# Patient Record
Sex: MALE | Race: BLACK OR AFRICAN AMERICAN | NOT HISPANIC OR LATINO | URBAN - METROPOLITAN AREA
[De-identification: names, ages, dates, MRNs, and addresses within clinical notes are randomized per-mention and may not be internally consistent; named-entity substitution may affect disease eponyms.]

---

## 2017-08-04 ENCOUNTER — EMERGENCY (EMERGENCY)
Facility: HOSPITAL | Age: 32
LOS: 1 days | Discharge: HOME | End: 2017-08-04
Attending: EMERGENCY MEDICINE | Admitting: EMERGENCY MEDICINE
Payer: SELF-PAY

## 2017-08-04 VITALS
RESPIRATION RATE: 16 BRPM | HEART RATE: 85 BPM | OXYGEN SATURATION: 95 % | TEMPERATURE: 98 F | SYSTOLIC BLOOD PRESSURE: 129 MMHG | DIASTOLIC BLOOD PRESSURE: 88 MMHG

## 2017-08-04 PROCEDURE — 99284 EMERGENCY DEPT VISIT MOD MDM: CPT

## 2017-08-04 RX ORDER — IBUPROFEN 200 MG
600 TABLET ORAL ONCE
Qty: 0 | Refills: 0 | Status: COMPLETED | OUTPATIENT
Start: 2017-08-04 | End: 2017-08-04

## 2017-08-04 RX ORDER — OXYCODONE HYDROCHLORIDE 5 MG/1
1 TABLET ORAL
Qty: 8 | Refills: 0 | OUTPATIENT
Start: 2017-08-04 | End: 2017-08-08

## 2017-08-04 RX ORDER — ACETAMINOPHEN 500 MG
975 TABLET ORAL ONCE
Qty: 0 | Refills: 0 | Status: COMPLETED | OUTPATIENT
Start: 2017-08-04 | End: 2017-08-04

## 2017-08-04 RX ADMIN — Medication 975 MILLIGRAM(S): at 22:24

## 2017-08-04 RX ADMIN — Medication 600 MILLIGRAM(S): at 22:24

## 2017-08-04 NOTE — CONSULT NOTE ADULT - SUBJECTIVE AND OBJECTIVE BOX
Patient is a 31y old Male who presents with a chief complaint of pain after breaking a tooth 2 days ago.    HPI: Patient presents to ED in pain reporting that he broke one of his back right teeth two days ago on a chicken bone. Patient states that he saw blood yesterday when he was brushing, but not today. Patient reports that yesterday his tongue touched the tooth after it broke and ever since has been painful.    PAST MEDICAL & SURGICAL HISTORY:  No pertinent past medical history    MEDICATIONS  (STANDING): None  MEDICATIONS  (PRN): None    Allergies: No Known Allergies    FAMILY HISTORY:  No pertinent family history in first degree relatives    SOCIAL HISTORY: Patient presents in ED alone. Patient works for [employer].    Last Dental Visit: 5 years ago    Vital Signs Last 24 Hrs  T(C): 36.6 (04 Aug 2017 21:21), Max: 36.6 (04 Aug 2017 21:21)  T(F): 97.9 (04 Aug 2017 21:21), Max: 97.9 (04 Aug 2017 21:21)  HR: 85 (04 Aug 2017 21:21) (85 - 85)  BP: 129/88 (04 Aug 2017 21:21) (129/88 - 129/88)  BP(mean): --  RR: 16 (04 Aug 2017 21:21) (16 - 16)  SpO2: 95% (04 Aug 2017 21:21) (95% - 95%)    EOE:  TMJ ( -  ) clicks                    ( -  ) pops                    (  -  ) crepitus             Mandible FROM             Facial bones and MOM grossly intact             (  - ) trismus             ( -  ) LAD             (  - ) swelling             ( -  ) asymmetry             ( -  ) palpation             ( -  ) dysphagia    IOE:  Permanent dentition, multiple restorations, multiple carious teeth           hard/soft palate:  (  - ) palatal torus           tongue/FOM WNL           labial/buccal mucosa WNL           (  - ) percussion           ( + ) palpation: Buccal and palatal sites #4/5           (  - ) swelling   Fistula present mucosa superior to site #4. Broken root tips visible at sites #4/5.    Radiographs: None taken at this encounter    ASSESSMENT: 31 year old male has retained root tips #4 and #5 and fistula at site #4/5. No signs of acute infection, no swelling, no purulence, no bleeding.     RECOMMENDATIONS:   1) Pain control as needed  2) Dental F/U with outpatient dentist for comprehensive dental care.   3) If any swelling or acute oral changes occur, return to the ED    Hattie Chandler DDS, Britta Montgomery DDS. Pager #: [phone]  Case discussed with chief resident: Dr. Ligia Hernandez.

## 2017-08-04 NOTE — ED PROVIDER NOTE - CARE PLAN
Principal Discharge DX:	Tooth pain  Instructions for follow-up, activity and diet:	Please follow-up with the Dental Clinic on Monday (8/7/14) for evaluation.  Can use over-the-counter Tylenol and Ibuprofen as per package directions.  Please come to the Emergency Department for re-evaluation if you start to develop fevers (temperature > 100.4), increased swelling, trouble swallowing, and/or shortness of breath.
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
## 2017-08-04 NOTE — ED PROVIDER NOTE - OBJECTIVE STATEMENT
32yo M presents to ED complaining of a chipped tooth and dental pain.  Mentions he was eating chicken 2 days ago where he cracked his tooth and spit it out.  Patient did not have any discomfort at this time.  This AM patient noticed blood after brushing, this was aggravated later while he was at work and the area started to bleed.  No fevers, chills, N/V.  No trouble swallowing, SOB, cough.  Was sick w/ flu last week => has resolved since then.  Has not tried anything at home for pain.  Has not seen a dentist for this problem.

## 2017-08-04 NOTE — ED PROVIDER NOTE - ATTENDING CONTRIBUTION TO CARE
31 yom no pmhx poor dentition at baseline, does not see dentists recently but has had mult prior cavities and dental fillings, missing a few teeth at baseline, states bit into a chicken bone and his right upper anterior molar fell out. states most of the molar was already missing at baseline, but the small remaining piece "cracked off." today was having shooting pains in the area when trying to eat/drink. no f/c.     ROS:   constitutional - no fever, no chills  eyes - no visual changes, no redness  eent - no sore throat, no nasal congestion  cvs - no chest pain, no leg swelling  resp - no shortness of breath, no cough  gi - no abdominal pain, no vomiting, no diarrhea  gu - no dysuria, no hematuria  msk - no acute back pain, no joint swelling  skin - no rashes, no jaundice  neuro - no headache, no focal weakness  psych - no acute mental health issue     Physical Exam:   constitutional - well appearing, awake and alert, oriented x3  head - no external evidence of trauma  cvs - rrr, no murmurs, no peripheral edema  resp - breath sounds clear and equal bilat  gi - abdomen soft and nontender, no rigidity, guarding or rebound, bowel sounds present  msk - moving all extremities spontaneously  neuro - alert and oriented x3, no focal deficits, CNs 2-12 grossly intact  skin- no jaundice, warm and dry  psych - mood and affect wnl, no apparent risk to self or others   ent - diffuse poor dentition w widespread dental caries and a few missing teeth at baseline. right upper anterior molar with small piece of visible tooth remaining, otherwise likely luke class 4 dental fx.     seen by dental in ED who feel the majority of the tooth was already missing prior to injury because there is chronic appearing gum growing over area of prior tooth. recommending pain management, f/u with dental / omfs clinic at [facility] on monday. additional verbal instructions regarding diagnosis, return precautions and follow up plan given to pt and/or family. MILENA Arredondo MD see attending statement below

## 2017-08-04 NOTE — ED PROVIDER NOTE - PHYSICAL EXAMINATION
Physical Exam: *Gen: NAD, AAO*3, well-appearing, well-nourished; *HEENT: NC/AT, airway patient, MMM, uvula midline, 6TH TOOTH (ON UPPER ARCH) IS MISSING W/ EVIDENCE OF A RETAINED ROOT TIP IN THE AREA - NO EVIDENCE OF ABSCESS OR SWELLING IN THE AREA; *Neck: supple, trachea midline; *CV: RRR, S1/S2 present, no murmurs/rubs/gallops, no peripheral edema; *Resp: no respiratory distress, LCTAB, no wheezing/rales/rhonchi; *Abd: non-distended, bowel sounds present, soft N/Tx4, no guarding or rigidity, no organomegaly; *Neuro: no focal neuro defecits, moving all limbs appropriately; *Extremities: no gross deformity, PMS*4; *Back: no gross deformity, no CVA tenderness ~ Cruz Ignacio M.D.

## 2017-08-04 NOTE — ED PROVIDER NOTE - NS ED ROS FT
Review of Systems: *Constitutional: no fevers, no chills; *Eyes: no eye pain, no blurred vision; *ENMT: no hearing changes, no sore throat, TOOTH PAIN; *CV: no chest pain, no palpitations; *Resp: no shortness of breath, no cough; *GI: no abdominal pain, no nausea, no vomiting, no diarrhea, no constipation, no bloody stools; *G/U: no dysuria, no frequency, no hematuria; *MSK: no joint pain, no swelling, no redness; *Skin: no rashes, no wounds; *Heme/Lymph: no bleeding, no bruising; *Allergic/Immunologic: no environmental allergies, no food allergies, no immunosuppressive disorder ~ Cruz Ignacio M.D.

## 2017-08-04 NOTE — ED PROVIDER NOTE - PLAN OF CARE
Please follow-up with the Dental Clinic on Monday (8/7/14) for evaluation.  Can use over-the-counter Tylenol and Ibuprofen as per package directions.  Please come to the Emergency Department for re-evaluation if you start to develop fevers (temperature > 100.4), increased swelling, trouble swallowing, and/or shortness of breath.

## 2018-01-18 ENCOUNTER — EMERGENCY (EMERGENCY)
Facility: HOSPITAL | Age: 33
LOS: 1 days | Discharge: HOME | End: 2018-01-18
Attending: EMERGENCY MEDICINE | Admitting: EMERGENCY MEDICINE
Payer: COMMERCIAL

## 2018-01-18 VITALS
RESPIRATION RATE: 18 BRPM | TEMPERATURE: 98 F | SYSTOLIC BLOOD PRESSURE: 133 MMHG | OXYGEN SATURATION: 98 % | HEART RATE: 74 BPM | DIASTOLIC BLOOD PRESSURE: 87 MMHG

## 2018-01-18 VITALS
TEMPERATURE: 98 F | HEART RATE: 73 BPM | RESPIRATION RATE: 18 BRPM | SYSTOLIC BLOOD PRESSURE: 100 MMHG | OXYGEN SATURATION: 99 % | DIASTOLIC BLOOD PRESSURE: 73 MMHG

## 2018-01-18 PROCEDURE — 99282 EMERGENCY DEPT VISIT SF MDM: CPT

## 2018-01-18 PROCEDURE — 99283 EMERGENCY DEPT VISIT LOW MDM: CPT

## 2018-01-18 RX ORDER — ACETAMINOPHEN 500 MG
975 TABLET ORAL ONCE
Qty: 0 | Refills: 0 | Status: COMPLETED | OUTPATIENT
Start: 2018-01-18 | End: 2018-01-18

## 2018-01-18 RX ADMIN — Medication 975 MILLIGRAM(S): at 20:38
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
## 2018-01-18 NOTE — ED PROVIDER NOTE - PHYSICAL EXAMINATION
Gen: Well appearing, NAD  Head: NCAT  HEENT: MMM, Normal conjunctiva  Lung: CTAB, no rales, rhonchi or wheezing  CV: RRR, no murmurs, rubs or gallops  Abd: soft, NTND, no rebound or guarding  MSK: No CVA tenderness. No edema, no visible deformities  Neuro: No focal neurologic deficits. CN II-XII intact. Normal strength and sensation x 4  Skin: Warm and dry, no evidence of rash  Psych: normal mood and affect, A&Ox3
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
## 2018-01-18 NOTE — ED PROVIDER NOTE - NS ED ROS FT
ROS: denies weakness, dizziness, fevers/chills, nausea/vomiting, chest pain, SOB, diaphoresis, abdominal pain, back/neck pain, dysuria/hematuria, or rash  +HA

## 2018-04-10 ENCOUNTER — EMERGENCY (EMERGENCY)
Facility: HOSPITAL | Age: 33
LOS: 1 days | Discharge: HOME | End: 2018-04-10
Attending: EMERGENCY MEDICINE
Payer: SELF-PAY

## 2018-04-10 VITALS
HEART RATE: 99 BPM | SYSTOLIC BLOOD PRESSURE: 132 MMHG | TEMPERATURE: 98 F | RESPIRATION RATE: 18 BRPM | DIASTOLIC BLOOD PRESSURE: 88 MMHG | OXYGEN SATURATION: 97 %

## 2018-04-10 LAB
APPEARANCE UR: CLEAR — SIGNIFICANT CHANGE UP
BILIRUB UR-MCNC: NEGATIVE — SIGNIFICANT CHANGE UP
COLOR SPEC: YELLOW — SIGNIFICANT CHANGE UP
DIFF PNL FLD: ABNORMAL
EPI CELLS # UR: SIGNIFICANT CHANGE UP /HPF
GLUCOSE UR QL: NEGATIVE — SIGNIFICANT CHANGE UP
HIV 1 & 2 AB SERPL IA.RAPID: SIGNIFICANT CHANGE UP
KETONES UR-MCNC: NEGATIVE — SIGNIFICANT CHANGE UP
LEUKOCYTE ESTERASE UR-ACNC: ABNORMAL
NITRITE UR-MCNC: NEGATIVE — SIGNIFICANT CHANGE UP
PH UR: 6 — SIGNIFICANT CHANGE UP (ref 5–8)
PROT UR-MCNC: 30 MG/DL
RBC CASTS # UR COMP ASSIST: ABNORMAL /HPF (ref 0–2)
SP GR SPEC: >1.03 — HIGH (ref 1.01–1.02)
UROBILINOGEN FLD QL: NEGATIVE — SIGNIFICANT CHANGE UP
WBC UR QL: SIGNIFICANT CHANGE UP /HPF (ref 0–5)

## 2018-04-10 PROCEDURE — 99284 EMERGENCY DEPT VISIT MOD MDM: CPT

## 2018-04-10 PROCEDURE — 86780 TREPONEMA PALLIDUM: CPT

## 2018-04-10 PROCEDURE — 81001 URINALYSIS AUTO W/SCOPE: CPT

## 2018-04-10 PROCEDURE — 99283 EMERGENCY DEPT VISIT LOW MDM: CPT

## 2018-04-10 PROCEDURE — 86703 HIV-1/HIV-2 1 RESULT ANTBDY: CPT

## 2018-04-10 RX ORDER — AZITHROMYCIN 500 MG/1
1000 TABLET, FILM COATED ORAL ONCE
Qty: 0 | Refills: 0 | Status: COMPLETED | OUTPATIENT
Start: 2018-04-10 | End: 2018-04-10

## 2018-04-10 RX ORDER — AZITHROMYCIN 500 MG/1
1000 TABLET, FILM COATED ORAL ONCE
Qty: 0 | Refills: 0 | Status: DISCONTINUED | OUTPATIENT
Start: 2018-04-10 | End: 2018-04-10

## 2018-04-10 RX ORDER — CEFTRIAXONE 500 MG/1
250 INJECTION, POWDER, FOR SOLUTION INTRAMUSCULAR; INTRAVENOUS ONCE
Qty: 0 | Refills: 0 | Status: COMPLETED | OUTPATIENT
Start: 2018-04-10 | End: 2018-04-10

## 2018-04-10 RX ORDER — AZITHROMYCIN 500 MG/1
1 TABLET, FILM COATED ORAL DAILY
Qty: 0 | Refills: 0 | Status: DISCONTINUED | OUTPATIENT
Start: 2018-04-10 | End: 2018-04-10

## 2018-04-10 RX ADMIN — CEFTRIAXONE 250 MILLIGRAM(S): 500 INJECTION, POWDER, FOR SOLUTION INTRAMUSCULAR; INTRAVENOUS at 22:05

## 2018-04-10 RX ADMIN — AZITHROMYCIN 1000 MILLIGRAM(S): 500 TABLET, FILM COATED ORAL at 22:19
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
## 2018-04-10 NOTE — ED ADULT TRIAGE NOTE - CHIEF COMPLAINT QUOTE
STD check   ex-partner tested positive for chlamydia STD check   ex-partner tested positive for chlamydia (today). Vaginal intercourse last night, no condom.
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
## 2018-04-10 NOTE — ED ADULT NURSE NOTE - CHIEF COMPLAINT QUOTE
STD check   ex-partner tested positive for chlamydia (today). Vaginal intercourse last night, no condom.

## 2018-04-11 LAB
C TRACH RRNA SPEC QL NAA+PROBE: DETECTED
N GONORRHOEA RRNA SPEC QL NAA+PROBE: SIGNIFICANT CHANGE UP
SPECIMEN SOURCE: SIGNIFICANT CHANGE UP
T PALLIDUM AB TITR SER: NEGATIVE — SIGNIFICANT CHANGE UP
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
## 2019-05-01 ENCOUNTER — EMERGENCY (EMERGENCY)
Facility: HOSPITAL | Age: 34
LOS: 1 days | Discharge: HOME | End: 2019-05-01
Attending: EMERGENCY MEDICINE | Admitting: EMERGENCY MEDICINE
Payer: COMMERCIAL

## 2019-05-01 VITALS
DIASTOLIC BLOOD PRESSURE: 59 MMHG | TEMPERATURE: 97 F | OXYGEN SATURATION: 100 % | SYSTOLIC BLOOD PRESSURE: 126 MMHG | RESPIRATION RATE: 18 BRPM | HEART RATE: 80 BPM

## 2019-05-01 LAB
ALBUMIN SERPL ELPH-MCNC: 4.2 G/DL — SIGNIFICANT CHANGE UP (ref 3.3–5)
ALP SERPL-CCNC: 69 U/L — SIGNIFICANT CHANGE UP (ref 40–120)
ALT FLD-CCNC: 28 U/L — SIGNIFICANT CHANGE UP (ref 4–41)
ANION GAP SERPL CALC-SCNC: 12 MMO/L — SIGNIFICANT CHANGE UP (ref 7–14)
AST SERPL-CCNC: 32 U/L — SIGNIFICANT CHANGE UP (ref 4–40)
BASOPHILS # BLD AUTO: 0.02 K/UL — SIGNIFICANT CHANGE UP (ref 0–0.2)
BASOPHILS NFR BLD AUTO: 0.3 % — SIGNIFICANT CHANGE UP (ref 0–2)
BILIRUB SERPL-MCNC: 0.3 MG/DL — SIGNIFICANT CHANGE UP (ref 0.2–1.2)
BUN SERPL-MCNC: 18 MG/DL — SIGNIFICANT CHANGE UP (ref 7–23)
CALCIUM SERPL-MCNC: 9.6 MG/DL — SIGNIFICANT CHANGE UP (ref 8.4–10.5)
CHLORIDE SERPL-SCNC: 102 MMOL/L — SIGNIFICANT CHANGE UP (ref 98–107)
CO2 SERPL-SCNC: 26 MMOL/L — SIGNIFICANT CHANGE UP (ref 22–31)
CREAT SERPL-MCNC: 1.23 MG/DL — SIGNIFICANT CHANGE UP (ref 0.5–1.3)
EOSINOPHIL # BLD AUTO: 0.28 K/UL — SIGNIFICANT CHANGE UP (ref 0–0.5)
EOSINOPHIL NFR BLD AUTO: 4.6 % — SIGNIFICANT CHANGE UP (ref 0–6)
GLUCOSE SERPL-MCNC: 85 MG/DL — SIGNIFICANT CHANGE UP (ref 70–99)
HCT VFR BLD CALC: 44.3 % — SIGNIFICANT CHANGE UP (ref 39–50)
HGB BLD-MCNC: 14.6 G/DL — SIGNIFICANT CHANGE UP (ref 13–17)
IMM GRANULOCYTES NFR BLD AUTO: 0.3 % — SIGNIFICANT CHANGE UP (ref 0–1.5)
LYMPHOCYTES # BLD AUTO: 1.65 K/UL — SIGNIFICANT CHANGE UP (ref 1–3.3)
LYMPHOCYTES # BLD AUTO: 27 % — SIGNIFICANT CHANGE UP (ref 13–44)
MCHC RBC-ENTMCNC: 30.8 PG — SIGNIFICANT CHANGE UP (ref 27–34)
MCHC RBC-ENTMCNC: 33 % — SIGNIFICANT CHANGE UP (ref 32–36)
MCV RBC AUTO: 93.5 FL — SIGNIFICANT CHANGE UP (ref 80–100)
MONOCYTES # BLD AUTO: 0.55 K/UL — SIGNIFICANT CHANGE UP (ref 0–0.9)
MONOCYTES NFR BLD AUTO: 9 % — SIGNIFICANT CHANGE UP (ref 2–14)
NEUTROPHILS # BLD AUTO: 3.58 K/UL — SIGNIFICANT CHANGE UP (ref 1.8–7.4)
NEUTROPHILS NFR BLD AUTO: 58.8 % — SIGNIFICANT CHANGE UP (ref 43–77)
NRBC # FLD: 0 K/UL — SIGNIFICANT CHANGE UP (ref 0–0)
PLATELET # BLD AUTO: 233 K/UL — SIGNIFICANT CHANGE UP (ref 150–400)
PMV BLD: 9.8 FL — SIGNIFICANT CHANGE UP (ref 7–13)
POTASSIUM SERPL-MCNC: 4.5 MMOL/L — SIGNIFICANT CHANGE UP (ref 3.5–5.3)
POTASSIUM SERPL-SCNC: 4.5 MMOL/L — SIGNIFICANT CHANGE UP (ref 3.5–5.3)
PROT SERPL-MCNC: 7.3 G/DL — SIGNIFICANT CHANGE UP (ref 6–8.3)
RBC # BLD: 4.74 M/UL — SIGNIFICANT CHANGE UP (ref 4.2–5.8)
RBC # FLD: 12.1 % — SIGNIFICANT CHANGE UP (ref 10.3–14.5)
SODIUM SERPL-SCNC: 140 MMOL/L — SIGNIFICANT CHANGE UP (ref 135–145)
WBC # BLD: 6.1 K/UL — SIGNIFICANT CHANGE UP (ref 3.8–10.5)
WBC # FLD AUTO: 6.1 K/UL — SIGNIFICANT CHANGE UP (ref 3.8–10.5)

## 2019-05-01 PROCEDURE — 99284 EMERGENCY DEPT VISIT MOD MDM: CPT

## 2019-05-01 PROCEDURE — 74177 CT ABD & PELVIS W/CONTRAST: CPT | Mod: 26

## 2019-05-01 RX ORDER — ONDANSETRON 8 MG/1
4 TABLET, FILM COATED ORAL ONCE
Qty: 0 | Refills: 0 | Status: COMPLETED | OUTPATIENT
Start: 2019-05-01 | End: 2019-05-01

## 2019-05-01 RX ORDER — MULTIVIT WITH MIN/MFOLATE/K2 340-15/3 G
150 POWDER (GRAM) ORAL
Qty: 300 | Refills: 0 | OUTPATIENT
Start: 2019-05-01 | End: 2019-05-01

## 2019-05-01 RX ORDER — SODIUM CHLORIDE 9 MG/ML
1000 INJECTION INTRAMUSCULAR; INTRAVENOUS; SUBCUTANEOUS ONCE
Qty: 0 | Refills: 0 | Status: COMPLETED | OUTPATIENT
Start: 2019-05-01 | End: 2019-05-01

## 2019-05-01 RX ORDER — DOCUSATE SODIUM 100 MG
1 CAPSULE ORAL
Qty: 30 | Refills: 0 | OUTPATIENT
Start: 2019-05-01 | End: 2019-05-15

## 2019-05-01 RX ADMIN — ONDANSETRON 4 MILLIGRAM(S): 8 TABLET, FILM COATED ORAL at 14:26

## 2019-05-01 RX ADMIN — SODIUM CHLORIDE 1000 MILLILITER(S): 9 INJECTION INTRAMUSCULAR; INTRAVENOUS; SUBCUTANEOUS at 14:29

## 2019-05-01 NOTE — ED ADULT TRIAGE NOTE - CHIEF COMPLAINT QUOTE
Co constipation and right mid abdominal pain since yesterday. Last normal BM was Monday. Pt states he vomited several times at 4am today.

## 2019-05-01 NOTE — ED PROVIDER NOTE - CLINICAL SUMMARY MEDICAL DECISION MAKING FREE TEXT BOX
immune labs and CT unremarkable, stool laxatives and diet/life style modifications educated; PMD or GI follow up recommended for reassessment

## 2019-05-01 NOTE — ED PROVIDER NOTE - NSFOLLOWUPCLINICS_GEN_ALL_ED_FT
[facility]  Gastroenterology  [address] [place] [id number]  Phone: [phone]  Fax:   Follow Up Time:

## 2019-09-26 ENCOUNTER — EMERGENCY (EMERGENCY)
Facility: HOSPITAL | Age: 34
LOS: 1 days | Discharge: HOME | End: 2019-09-26
Attending: EMERGENCY MEDICINE
Payer: COMMERCIAL

## 2019-09-26 VITALS
RESPIRATION RATE: 17 BRPM | HEIGHT: 73 IN | DIASTOLIC BLOOD PRESSURE: 86 MMHG | WEIGHT: 195.11 LBS | TEMPERATURE: 100 F | SYSTOLIC BLOOD PRESSURE: 130 MMHG | HEART RATE: 81 BPM | OXYGEN SATURATION: 98 %

## 2019-09-26 VITALS
TEMPERATURE: 98 F | SYSTOLIC BLOOD PRESSURE: 122 MMHG | HEART RATE: 80 BPM | RESPIRATION RATE: 18 BRPM | OXYGEN SATURATION: 99 % | DIASTOLIC BLOOD PRESSURE: 74 MMHG

## 2019-09-26 PROBLEM — Z78.9 OTHER SPECIFIED HEALTH STATUS: Chronic | Status: ACTIVE | Noted: 2019-05-01

## 2019-09-26 PROCEDURE — 99283 EMERGENCY DEPT VISIT LOW MDM: CPT | Mod: 25

## 2019-09-26 PROCEDURE — 71046 X-RAY EXAM CHEST 2 VIEWS: CPT | Mod: 26

## 2019-09-26 PROCEDURE — 71046 X-RAY EXAM CHEST 2 VIEWS: CPT

## 2019-09-26 PROCEDURE — 99283 EMERGENCY DEPT VISIT LOW MDM: CPT

## 2019-09-26 RX ORDER — IBUPROFEN 200 MG
600 TABLET ORAL ONCE
Refills: 0 | Status: COMPLETED | OUTPATIENT
Start: 2019-09-26 | End: 2019-09-26

## 2019-09-26 RX ADMIN — Medication 600 MILLIGRAM(S): at 18:20

## 2019-09-26 RX ADMIN — Medication 600 MILLIGRAM(S): at 16:42

## 2019-09-26 NOTE — ED PROVIDER NOTE - CLINICAL SUMMARY MEDICAL DECISION MAKING FREE TEXT BOX
Danii Avalos MD - Attending Physician: Pt here with atypical chest pain x 2 days. Clinically c/w MSK spasm/pain. Low risk ACS, low risk PE. EKG, Xr for eval

## 2019-09-26 NOTE — ED PROVIDER NOTE - OBJECTIVE STATEMENT
33 y/o male with no significant PMH presents to the ED c/o CP onset two days ago while laying down. He reports some difficulty breathing and radiation of the pain from underneath his left nipple to his shoulder and back. No trauma. No hx of blood clots. No fever or chills, abdominal pain. No previous hx of CP. No Family hx of CP or early deaths. No hx blood clots. No leg swelling. 33 y/o male with no significant PMH presents to the ED c/o CP onset two days ago while laying down. He reports some difficulty breathing and radiation of the pain from underneath his left nipple to his shoulder and back. No trauma. No hx of blood clots. No fever or chills, abdominal pain. No previous hx of CP. No Family hx of CP or early deaths. No leg swelling. Current 6-7 cigarette per day smoker. No cocaine or other drug usage. 33 y/o male with no significant PMH presents to the ED c/o CP onset two days ago while laying down. He reports some difficulty breathing and radiation of the pain from underneath his left nipple to his shoulder and back intermittently. No trauma. No hx of blood clots. No fever or chills, abdominal pain. No previous hx of CP. No Family hx of CP or early deaths. No leg swelling. Current 6-7 cigarette per day smoker. No cocaine or other drug usage. Pain worsens with movement. Has not tried anything for the pain

## 2019-09-26 NOTE — ED PROVIDER NOTE - NSFOLLOWUPINSTRUCTIONS_ED_ALL_ED_FT
Thank you for visiting our Emergency Department, it has been a pleasure taking part in your healthcare.    Please follow up with your Primary Doctor in 2-3 days.      Noncardiac Chest Pain    WHAT YOU NEED TO KNOW:    Noncardiac chest pain is pain or discomfort in your chest not caused by a heart problem. It may be caused by acid reflux, nerve or muscle problems within your esophagus, or chest wall, muscle, or rib pain. It may also be caused by panic attacks, anxiety, or depression.    DISCHARGE INSTRUCTIONS:    Return to the emergency department if:     You have severe chest pain.        Contact your healthcare provider if:     Your chest pain does not get better, even with treatment.      You have questions or concerns about your condition or care.    Medicines:     Medicines may be given to treat the cause of your chest pain. You may be given medicines to decrease pain, relieve anxiety, decrease acid reflux, or relax muscles in your esophagus.       Take your medicine as directed. Contact your healthcare provider if you think your medicine is not helping or if you have side effects. Tell him of her if you are allergic to any medicine. Keep a list of the medicines, vitamins, and herbs you take. Include the amounts, and when and why you take them. Bring the list or the pill bottles to follow-up visits. Carry your medicine list with you in case of an emergency.    Cognitive therapy: Cognitive therapy may be helpful if you have panic attacks, anxiety, or depression. It can help you change how you react to situations that tend to trigger your chest pain.     Follow up with your healthcare provider as directed: Write down your questions so you remember to ask them during your visits.

## 2019-09-26 NOTE — ED PROVIDER NOTE - ATTENDING CONTRIBUTION TO CARE
Danii Avalos MD - Attending Physician: I have personally seen and examined this patient with the resident/fellow.  I have fully participated in the care of this patient. I have reviewed all pertinent clinical information, including history, physical exam, plan and the Resident/Fellow’s note and agree except as noted. See MDM

## 2019-09-26 NOTE — ED PROVIDER NOTE - PHYSICAL EXAMINATION
GENERAL: Patient awake alert NAD.  HEENT: NC/AT, Moist mucous membranes, PERRL, EOMI.  LUNGS: CTAB, no wheezes or crackles.   CHEST WALL: tenderness to palpation over chest wall below left nipple  CARDIAC: RRR, no m/r/g.    ABDOMEN: Soft, NT, ND, No rebound, guarding. No CVA tenderness.   EXT: No edema. No calf tenderness. CV 2+DP/PT bilaterally.   MSK: No spinal tenderness, no pain with movement, no deformities.  NEURO: A&Ox3. Moving all extremities.  SKIN: Warm and dry. No rash.  PSYCH: Normal affect.

## 2019-09-26 NOTE — ED ADULT NURSE NOTE - CHPI ED NUR SYMPTOMS NEG
no syncope/no congestion/no diaphoresis/no fever/no back pain/no chills/no dizziness/no chest pain/no nausea/no shortness of breath/no vomiting

## 2019-09-26 NOTE — ED PROVIDER NOTE - NS ED ROS FT
CONST: no fevers, no chills  EYES: no pain, no vision changes  ENT: no sore throat, no ear pain, no change in hearing  CV: + chest pain, no leg swelling  RESP: + shortness of breath, no cough  ABD: no abdominal pain, no nausea, no vomiting, no diarrhea  : no dysuria, no flank pain, no hematuria  MSK: no back pain, no extremity pain  NEURO: no headache or additional neurologic complaints  HEME: no easy bleeding  SKIN:  no rash CONST: no fevers, no chills  EYES: no pain, no vision changes  ENT: no sore throat, no ear pain, no change in hearing  CV: + chest pain, no leg swelling  RESP: + shortness of breath, no cough  ABD: no abdominal pain, no nausea, no vomiting, no diarrhea  : no dysuria, no flank pain, no hematuria  MSK: no back pain, no extremity pain  NEURO: no headache or additional neurologic complaints  HEME: no easy bleeding  SKIN:  no rash    All other systems negative

## 2019-09-26 NOTE — ED ADULT NURSE NOTE - OBJECTIVE STATEMENT
34 year old male presented to ED with c/o of intermittent aching chest pain x2 days ago while laying down. pt states the pain starts under left nipple and radiates to shoulder and back. pt denies current CP, SOB, nausea/vomiting, numbness/tingling, fever, cough, chills, dizziness, headache, blurred vision, neuro intact. pt a&ox3, lung sounds clear, heart rate regular, abdomen soft nontender nondistended to palp. skin intact. Will continue to monitor and assess while offering support and reassurance.

## 2019-09-26 NOTE — ED PROVIDER NOTE - PATIENT PORTAL LINK FT
Subjective





- Date & Time of Evaluation


Date of Evaluation: 04/26/18


Time of Evaluation: 11:30





- Subjective


Subjective: 





Surgery 





Pt seen and examined. No acute wvents. Denies bloody BM. Getting HD. Tolerating 

diet. 





Objective





- Vital Signs/Intake and Output


Vital Signs (last 24 hours): 


 











Temp Pulse Resp BP Pulse Ox


 


 98.1 F   96 H  15   101/64   99 


 


 04/26/18 04:00  04/26/18 09:38  04/26/18 07:00  04/26/18 09:38  04/26/18 07:00








Intake and Output: 


 











 04/26/18 04/26/18





 06:59 18:59


 


Intake Total 600 


 


Balance 600 














- Medications


Medications: 


 Current Medications





Albuterol/Ipratropium (Duoneb 3 Mg/0.5 Mg (3 Ml) Ud)  3 ml IH [id number] [facility]


   Last Admin: 04/26/18 07:37 Dose:  3 ml


Budesonide (Pulmicort Respules)  0.5 mg IH [id number] [facility]


   Last Admin: 04/26/18 07:37 Dose:  0.5 mg


Calcium Acetate (Phoslo)  667 mg PO WM [facility]


Diltiazem HCl (Cardizem)  90 mg PO QID [facility]


   Last Admin: 04/26/18 09:38 Dose:  90 mg


Escitalopram Oxalate (Lexapro)  5 mg PO DAILY [facility]


   Last Admin: 04/26/18 11:08 Dose:  5 mg


Folic Acid (Folic Acid)  1 mg PO DAILY [facility]


   Last Admin: 04/26/18 09:38 Dose:  1 mg


Piperacillin Sod/Tazobactam Sod (Zosyn 2.25 Gm In 0.9% 100 Ml)  2.25 gm in 100 

mls @ 100 mls/hr IVPB Q6 [facility]


   PRN Reason: Protocol


   Stop: 05/01/18 12:01


   Last Admin: 04/26/18 11:07 Dose:  100 mls/hr


Insulin Human Regular (Humulin R Med)  0 units SC ACHS [facility]


   PRN Reason: Protocol


   Last Admin: 04/26/18 08:31 Dose:  3 units


Lidocaine (Lidoderm)  1 ea TD DAILY [facility]


   Last Admin: 04/26/18 09:40 Dose:  1 ea


Methylprednisolone (Solu-Medrol)  20 mg IVP Q8 [facility]


   Last Admin: 04/26/18 05:23 Dose:  20 mg


Morphine Sulfate (Morphine)  1 mg IVP Q6 PRN


   PRN Reason: PAIN, MODERATE [4-6]


   Last Admin: 04/26/18 09:41 Dose:  1 mg


Oxymetazoline HCl (Afrin 0.05%)  0 ml NS Q12H [facility]


   Last Admin: 04/25/18 22:24 Dose:  2 spr


Pantoprazole Sodium (Protonix Inj)  40 mg IVP Q12 [facility]


   Last Admin: 04/26/18 09:39 Dose:  40 mg


Polyethylene Glycol (Miralax)  17 gm PO BID [facility]


   Last Admin: 04/26/18 09:40 Dose:  17 gm


Sevelamer HCl (Renagel)  800 mg PO TID [facility]


   Last Admin: 04/26/18 09:40 Dose:  800 mg


Vitamin B Complex/Vit C/Folic Acid (Nephro-Javier)  1 tab PO 0800 [facility]


   Last Admin: 04/26/18 08:31 Dose:  1 tab











- Labs


Labs: 


 





 04/26/18 06:00 





 04/26/18 06:00 





 











PT  17.2 SECONDS (9.4-12.5)  H  04/26/18  06:00    


 


INR  1.48  (0.93-1.08)  H  04/26/18  06:00    


 


APTT  26.6 Seconds (25.1-36.5)   04/26/18  06:00    














- Constitutional


Appears: No Acute Distress





- Head Exam


Head Exam: ATRAUMATIC, NORMAL INSPECTION, NORMOCEPHALIC





- Eye Exam


Eye Exam: EOMI, Normal appearance, PERRL


Pupil Exam: NORMAL ACCOMODATION, PERRL





- ENT Exam


ENT Exam: Mucous Membranes Moist, Normal Exam





- Neck Exam


Neck Exam: Full ROM, Normal Inspection.  absent: Lymphadenopathy





- Respiratory Exam


Respiratory Exam: Clear to Ausculation Bilateral, NORMAL BREATHING PATTERN





- Cardiovascular Exam


Cardiovascular Exam: REGULAR RHYTHM, +S1, +S2.  absent: Murmur





- GI/Abdominal Exam


GI & Abdominal Exam: Soft, Normal Bowel Sounds.  absent: Tenderness





-  Exam


 Exam: Circumcision, NORMAL INSPECTION





- Extremities Exam


Extremities Exam: Full ROM, Normal Capillary Refill, Normal Inspection.  absent

: Joint Swelling, Pedal Edema





- Back Exam


Back Exam: NORMAL INSPECTION





- Neurological Exam


Neurological Exam: Alert, Awake, CN II-XII Intact, Normal Gait, Oriented x3





- Psychiatric Exam


Psychiatric exam: Normal Affect, Normal Mood





- Skin


Skin Exam: Dry, Intact, Normal Color, Warm





Assessment and Plan





- Assessment and Plan (Free Text)


Assessment: 





Lower GI bleed: resolved


Acute kidney injury : Cr continued to be elevated with HD 


Plan: 





L arm precaution for possible elective outpatinet AVF creation 


Will Get vein map


PRBC as needed. 


MEdical management


Monitor for bloody BM





DW Dr. Huerta You can access the FollowMyHealth Patient Portal offered by [facility] by registering at the following website: [email or web address]. By joining [facility]’s FollowMyHealth portal, you will also be able to view your health information using other applications (apps) compatible with our system.

## 2022-06-21 NOTE — ED ADULT TRIAGE NOTE - AS O2 DELIVERY
Detail Level: Detailed
Products Recommended: SPF 30+
General Sunscreen Counseling: I recommended a broad spectrum sunscreen with a SPF of 30 or higher.  I explained that SPF 30 sunscreens block approximately 97 percent of the sun's harmful rays.  Sunscreens should be applied at least 15 minutes prior to expected sun exposure and then every 2 hours after that as long as sun exposure continues. If swimming or exercising sunscreen should be reapplied every 45 minutes to an hour after getting wet or sweating.  One ounce, or the equivalent of a shot glass full of sunscreen, is adequate to protect the skin not covered by a bathing suit. I also recommended a lip balm with a sunscreen as well. Sun protective clothing can be used in lieu of sunscreen but must be worn the entire time you are exposed to the sun's rays.
room air
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
## 2024-03-30 NOTE — ED ADULT NURSE NOTE - CAS DISCH TRANSFER METHOD
The patient's goals for the shift include      The clinical goals for the shift include safety    
Private car
